# Patient Record
Sex: MALE | Race: WHITE | NOT HISPANIC OR LATINO | URBAN - METROPOLITAN AREA
[De-identification: names, ages, dates, MRNs, and addresses within clinical notes are randomized per-mention and may not be internally consistent; named-entity substitution may affect disease eponyms.]

---

## 2023-05-11 ENCOUNTER — OFFICE VISIT (OUTPATIENT)
Dept: PRIMARY CARE | Facility: CLINIC | Age: 38
End: 2023-05-11
Payer: COMMERCIAL

## 2023-05-11 VITALS
DIASTOLIC BLOOD PRESSURE: 74 MMHG | HEART RATE: 75 BPM | BODY MASS INDEX: 25.19 KG/M2 | HEIGHT: 72 IN | TEMPERATURE: 98.6 F | WEIGHT: 186 LBS | SYSTOLIC BLOOD PRESSURE: 112 MMHG

## 2023-05-11 DIAGNOSIS — K08.89 PAIN, DENTAL: ICD-10-CM

## 2023-05-11 DIAGNOSIS — R51.9 NONINTRACTABLE HEADACHE, UNSPECIFIED CHRONICITY PATTERN, UNSPECIFIED HEADACHE TYPE: ICD-10-CM

## 2023-05-11 DIAGNOSIS — R51.9 LEFT FACIAL PAIN: Primary | ICD-10-CM

## 2023-05-11 PROCEDURE — 99214 OFFICE O/P EST MOD 30 MIN: CPT | Performed by: FAMILY MEDICINE

## 2023-05-11 PROCEDURE — 1036F TOBACCO NON-USER: CPT | Performed by: FAMILY MEDICINE

## 2023-05-11 RX ORDER — PIMECROLIMUS 10 MG/G
CREAM TOPICAL
COMMUNITY
Start: 2020-10-08

## 2023-05-11 RX ORDER — KETOROLAC TROMETHAMINE 10 MG/1
10 TABLET, FILM COATED ORAL EVERY 6 HOURS PRN
COMMUNITY
Start: 2017-11-28

## 2023-05-11 NOTE — PROGRESS NOTES
Subjective     Kvng Corbin is a 37 y.o. male who presents for Hospital Follow-up (Pt. In for hospital follow up for headache.).    HPI     Pt is here to follow up from recent [place] ER visit for left sided facial pain and headache.   He reports left molar cavity that was filled by his dentist in Sept 2022.  He has been having some tooth sensitivity in upper left mouth region so he went to his dentist in Feb 2023 and had an xray of his tooth and he was told he had impacted wisdom tooth.  He then developed pain with chewing over the left upper mouth/molar area.  His left sided facial pain started last week.  He was concerned for possible headache so he went to ER.   He went to [place] [facility] on 5/7/23.  He had negative CT head and had normal carboxyhemoglobin level (rule out carbon monoxide poisoning).  He was prescribed tegretol to treat a possible trigeminal neuralgia which he has been taking.  He does notice some improvement in his symptoms.      He reports hx of migraine headache.    He denies fevers, chills, nasal congestion or purulent nasal drainage.          Review of Systems   Eyes:  Negative for visual disturbance.   Respiratory:  Negative for shortness of breath.    Cardiovascular:  Negative for chest pain.   Neurological:  Negative for dizziness.       Objective     Vitals:    05/11/23 1533   BP: 112/74   BP Location: Right arm   Patient Position: Sitting   Pulse: 75   Temp: 37 °C (98.6 °F)   TempSrc: Temporal   Weight: 84.4 kg (186 lb)   Height: 1.829 m (6')        Current Outpatient Medications   Medication Instructions    ketorolac (TORADOL) 10 mg, oral, Every 6 hours PRN    pimecrolimus (Elidel) 1 % cream Elidel 1 % External Cream   Refills: 0        Bertrand Li DO   Start : 8-Oct-2020   Active  30 GM Tube        Past Surgical History:   Procedure Laterality Date    OTHER SURGICAL HISTORY  10/08/2020    Tonsillectomy        Social History     Tobacco Use    Smoking status: Never    Smokeless  tobacco: Never   Vaping Use    Vaping status: Never Used        No family history on file.       There is no immunization history on file for this patient.     Physical Exam  Constitutional:       General: He is not in acute distress.     Appearance: He is not toxic-appearing.   HENT:      Head: Normocephalic and atraumatic.      Comments: Jaw - no clicking with opening.  No TMJ ttp.     Right Ear: Tympanic membrane, ear canal and external ear normal.      Left Ear: Tympanic membrane, ear canal and external ear normal.      Nose: Nose normal. No congestion or rhinorrhea.      Mouth/Throat:      Mouth: Mucous membranes are moist.      Pharynx: Oropharynx is clear. No oropharyngeal exudate or posterior oropharyngeal erythema.      Comments: No sign of dental infection or abscess.        Eyes:      Conjunctiva/sclera: Conjunctivae normal.   Cardiovascular:      Rate and Rhythm: Normal rate and regular rhythm.   Pulmonary:      Effort: Pulmonary effort is normal. No respiratory distress.      Breath sounds: Normal breath sounds. No wheezing, rhonchi or rales.   Musculoskeletal:      Cervical back: Normal range of motion.   Lymphadenopathy:      Cervical: No cervical adenopathy.   Skin:     General: Skin is warm and dry.      Findings: No rash.      Comments: No ttp over skin of face or jaw.   Neurological:      Mental Status: He is alert and oriented to person, place, and time. Mental status is at baseline.   Psychiatric:         Mood and Affect: Mood normal.         Behavior: Behavior normal.         Problem List Items Addressed This Visit       Left facial pain - Primary     Other Visit Diagnoses       Pain, dental        Nonintractable headache, unspecified chronicity pattern, unspecified headache type                Assessment/Plan      Facial pain, left sided - improving - possible nerve irritation from dental pain vs trigeminal neuralgia vs atypical migraine vs acute sinusitis vs bruxism/TMJ.  Agree with  continuing tegretol and seeing dentist next week.  If symptoms persist will need to see ENT specialist.  Pt agreeable.  Follow up if no improvement or if symptoms worsen, go the the nearest Emergency Department if your condition worsens significantly or becomes life-threatening.

## 2023-05-12 ASSESSMENT — ENCOUNTER SYMPTOMS
DIZZINESS: 0
SHORTNESS OF BREATH: 0

## 2025-01-23 ENCOUNTER — APPOINTMENT (OUTPATIENT)
Facility: CLINIC | Age: 40
End: 2025-01-23
Payer: COMMERCIAL

## 2025-01-23 VITALS
OXYGEN SATURATION: 98 % | HEIGHT: 72 IN | BODY MASS INDEX: 25.73 KG/M2 | SYSTOLIC BLOOD PRESSURE: 121 MMHG | HEART RATE: 72 BPM | DIASTOLIC BLOOD PRESSURE: 81 MMHG | WEIGHT: 190 LBS | TEMPERATURE: 97.6 F | RESPIRATION RATE: 18 BRPM

## 2025-01-23 DIAGNOSIS — M67.40 GANGLION CYST: ICD-10-CM

## 2025-01-23 DIAGNOSIS — L72.9 SKIN CYST: ICD-10-CM

## 2025-01-23 DIAGNOSIS — Z00.00 HEALTHCARE MAINTENANCE: Primary | ICD-10-CM

## 2025-01-23 PROCEDURE — 99395 PREV VISIT EST AGE 18-39: CPT | Performed by: FAMILY MEDICINE

## 2025-01-23 PROCEDURE — 93000 ELECTROCARDIOGRAM COMPLETE: CPT | Performed by: FAMILY MEDICINE

## 2025-01-23 PROCEDURE — 3008F BODY MASS INDEX DOCD: CPT | Performed by: FAMILY MEDICINE

## 2025-01-23 PROCEDURE — 1036F TOBACCO NON-USER: CPT | Performed by: FAMILY MEDICINE

## 2025-01-23 RX ORDER — KETOCONAZOLE 20 MG/ML
SHAMPOO, SUSPENSION TOPICAL
COMMUNITY

## 2025-01-23 ASSESSMENT — PATIENT HEALTH QUESTIONNAIRE - PHQ9
SUM OF ALL RESPONSES TO PHQ9 QUESTIONS 1 AND 2: 0
1. LITTLE INTEREST OR PLEASURE IN DOING THINGS: NOT AT ALL
2. FEELING DOWN, DEPRESSED OR HOPELESS: NOT AT ALL

## 2025-01-23 ASSESSMENT — ENCOUNTER SYMPTOMS
HEADACHES: 0
SHORTNESS OF BREATH: 0

## 2025-01-23 NOTE — PROGRESS NOTES
Subjective     Kvng Corbin is a 39 y.o. male who presents for Annual Exam.    HPI     Pt is here today for annual well exam.  He feels well.    He sees dermatology , seborrheic dermatitis     Review of Systems   Respiratory:  Negative for shortness of breath.    Cardiovascular:  Negative for chest pain.   Neurological:  Negative for headaches.       Objective     Vitals:    01/23/25 1453   BP: 121/81   BP Location: Left arm   Patient Position: Sitting   Pulse: 72   Resp: 18   Temp: 36.4 °C (97.6 °F)   TempSrc: Temporal   SpO2: 98%   Weight: 86.2 kg (190 lb)   Height: 1.829 m (6')        Current Outpatient Medications   Medication Instructions    ketoconazole (NIZOral) 2 % shampoo WASH DAILY WHILE FLARING THEN 3 TIMES A WEEK    pimecrolimus (Elidel) 1 % cream Elidel 1 % External Cream   Refills: 0        Bertrand Li DO   Start : 8-Oct-2020   Active  30 GM Tube        No Known Allergies     Past Surgical History:   Procedure Laterality Date    TONSILLECTOMY          Social History     Tobacco Use    Smoking status: Never    Smokeless tobacco: Never   Vaping Use    Vaping status: Never Used   Substance Use Topics    Alcohol use: Not Currently    Drug use: Never        Family History   Problem Relation Name Age of Onset    Hypertension Mother          Immunization History   Administered Date(s) Administered    Tdap vaccine, age 7 year and older (BOOSTRIX, ADACEL) 05/31/2019        Physical Exam  Vitals reviewed.   Constitutional:       General: He is not in acute distress.     Appearance: Normal appearance.   HENT:      Head: Normocephalic and atraumatic.      Right Ear: Tympanic membrane, ear canal and external ear normal.      Left Ear: Tympanic membrane, ear canal and external ear normal.      Mouth/Throat:      Mouth: Mucous membranes are moist.      Pharynx: Oropharynx is clear. No oropharyngeal exudate or posterior oropharyngeal erythema.   Eyes:      Extraocular Movements: Extraocular movements intact.       Pupils: Pupils are equal, round, and reactive to light.   Neck:      Thyroid: No thyroid mass or thyromegaly.   Cardiovascular:      Rate and Rhythm: Normal rate and regular rhythm.      Heart sounds: No murmur heard.  Pulmonary:      Effort: Pulmonary effort is normal. No respiratory distress.      Breath sounds: Normal breath sounds. No wheezing, rhonchi or rales.   Abdominal:      General: Abdomen is flat. There is no distension.      Palpations: Abdomen is soft.      Tenderness: There is no abdominal tenderness.   Genitourinary:     Testes: Normal.   Musculoskeletal:         General: Normal range of motion.   Lymphadenopathy:      Cervical: No cervical adenopathy.   Skin:     General: Skin is warm and dry.      Findings: No rash.      Comments: Right subcutaneous skin nodule/cyst palpated , chronic per patient , nontender.  No enlargement per pt    Right dorsal hand cyst - possible ganglion cyst, chronic    Neurological:      Mental Status: He is alert and oriented to person, place, and time. Mental status is at baseline.   Psychiatric:         Mood and Affect: Mood normal.         Behavior: Behavior normal.         Assessment & Plan  Healthcare maintenance  Well Exam     Vaccines - tdap utd    Flu vaccine declined     I recommend yearly flu vaccine and routine COVID vaccinations as indicated     Complete labs    Healthy diet, routine exercise was discussed with patient           Orders:    CBC and Auto Differential; Future    Comprehensive Metabolic Panel; Future    Lipid Panel; Future    Hemoglobin A1C; Future    ECG 12 Lead    Ganglion cyst  Right dorsal hand       Skin cyst  Right thigh, pea sized

## 2025-02-01 LAB
ALBUMIN SERPL-MCNC: 4.5 G/DL (ref 3.6–5.1)
ALP SERPL-CCNC: 47 U/L (ref 36–130)
ALT SERPL-CCNC: 10 U/L (ref 9–46)
ANION GAP SERPL CALCULATED.4IONS-SCNC: 6 MMOL/L (CALC) (ref 7–17)
AST SERPL-CCNC: 17 U/L (ref 10–40)
BASOPHILS # BLD AUTO: 62 CELLS/UL (ref 0–200)
BASOPHILS NFR BLD AUTO: 1 %
BILIRUB SERPL-MCNC: 0.8 MG/DL (ref 0.2–1.2)
BUN SERPL-MCNC: 10 MG/DL (ref 7–25)
CALCIUM SERPL-MCNC: 9.3 MG/DL (ref 8.6–10.3)
CHLORIDE SERPL-SCNC: 104 MMOL/L (ref 98–110)
CHOLEST SERPL-MCNC: 169 MG/DL
CHOLEST/HDLC SERPL: 2.9 (CALC)
CO2 SERPL-SCNC: 29 MMOL/L (ref 20–32)
CREAT SERPL-MCNC: 1 MG/DL (ref 0.6–1.26)
EGFRCR SERPLBLD CKD-EPI 2021: 99 ML/MIN/1.73M2
EOSINOPHIL # BLD AUTO: 149 CELLS/UL (ref 15–500)
EOSINOPHIL NFR BLD AUTO: 2.4 %
ERYTHROCYTE [DISTWIDTH] IN BLOOD BY AUTOMATED COUNT: 12.1 % (ref 11–15)
EST. AVERAGE GLUCOSE BLD GHB EST-MCNC: 108 MG/DL
EST. AVERAGE GLUCOSE BLD GHB EST-SCNC: 6 MMOL/L
GLUCOSE SERPL-MCNC: 87 MG/DL (ref 65–99)
HBA1C MFR BLD: 5.4 % OF TOTAL HGB
HCT VFR BLD AUTO: 44 % (ref 38.5–50)
HDLC SERPL-MCNC: 58 MG/DL
HGB BLD-MCNC: 14.2 G/DL (ref 13.2–17.1)
LDLC SERPL CALC-MCNC: 97 MG/DL (CALC)
LYMPHOCYTES # BLD AUTO: 2027 CELLS/UL (ref 850–3900)
LYMPHOCYTES NFR BLD AUTO: 32.7 %
MCH RBC QN AUTO: 29.6 PG (ref 27–33)
MCHC RBC AUTO-ENTMCNC: 32.3 G/DL (ref 32–36)
MCV RBC AUTO: 91.7 FL (ref 80–100)
MONOCYTES # BLD AUTO: 614 CELLS/UL (ref 200–950)
MONOCYTES NFR BLD AUTO: 9.9 %
NEUTROPHILS # BLD AUTO: 3348 CELLS/UL (ref 1500–7800)
NEUTROPHILS NFR BLD AUTO: 54 %
NONHDLC SERPL-MCNC: 111 MG/DL (CALC)
PLATELET # BLD AUTO: 146 THOUSAND/UL (ref 140–400)
PMV BLD REES-ECKER: 12.1 FL (ref 7.5–12.5)
POTASSIUM SERPL-SCNC: 4.4 MMOL/L (ref 3.5–5.3)
PROT SERPL-MCNC: 7 G/DL (ref 6.1–8.1)
RBC # BLD AUTO: 4.8 MILLION/UL (ref 4.2–5.8)
SODIUM SERPL-SCNC: 139 MMOL/L (ref 135–146)
TRIGL SERPL-MCNC: 59 MG/DL
WBC # BLD AUTO: 6.2 THOUSAND/UL (ref 3.8–10.8)